# Patient Record
Sex: MALE | Race: WHITE | Employment: FULL TIME | URBAN - METROPOLITAN AREA
[De-identification: names, ages, dates, MRNs, and addresses within clinical notes are randomized per-mention and may not be internally consistent; named-entity substitution may affect disease eponyms.]

---

## 2021-09-19 ENCOUNTER — HOSPITAL ENCOUNTER (EMERGENCY)
Age: 26
Discharge: HOME | End: 2021-09-19
Attending: EMERGENCY MEDICINE
Payer: COMMERCIAL

## 2021-09-19 VITALS
SYSTOLIC BLOOD PRESSURE: 164 MMHG | RESPIRATION RATE: 16 BRPM | WEIGHT: 250 LBS | HEART RATE: 78 BPM | OXYGEN SATURATION: 100 % | BODY MASS INDEX: 30.44 KG/M2 | HEIGHT: 76 IN | DIASTOLIC BLOOD PRESSURE: 88 MMHG | TEMPERATURE: 98.1 F

## 2021-09-19 DIAGNOSIS — S01.01XA LACERATION OF SCALP, INITIAL ENCOUNTER: Primary | ICD-10-CM

## 2021-09-19 DIAGNOSIS — S09.90XA INJURY OF HEAD, INITIAL ENCOUNTER: ICD-10-CM

## 2021-09-19 PROCEDURE — 99283 EMERGENCY DEPT VISIT LOW MDM: CPT

## 2021-09-19 PROCEDURE — 75810000293 HC SIMP/SUPERF WND  RPR

## 2021-09-19 PROCEDURE — 74011250637 HC RX REV CODE- 250/637: Performed by: EMERGENCY MEDICINE

## 2021-09-19 RX ORDER — OXYCODONE AND ACETAMINOPHEN 5; 325 MG/1; MG/1
1 TABLET ORAL
Status: DISCONTINUED | OUTPATIENT
Start: 2021-09-19 | End: 2021-09-19

## 2021-09-19 RX ORDER — OXYCODONE AND ACETAMINOPHEN 5; 325 MG/1; MG/1
1 TABLET ORAL
Status: COMPLETED | OUTPATIENT
Start: 2021-09-19 | End: 2021-09-19

## 2021-09-19 RX ADMIN — OXYCODONE HYDROCHLORIDE AND ACETAMINOPHEN 1 TABLET: 5; 325 TABLET ORAL at 14:13

## 2021-09-19 NOTE — ED TRIAGE NOTES
Patient states a large  hit a piece of wood and struck him in the head. No loc.  Laceration to head noted pressure dressing applied

## 2021-09-19 NOTE — LETTER
NOTIFICATION RETURN TO WORK / SCHOOL    9/19/2021 2:09 PM    Mr. Cornelio Mayo  [facility] [id number]      To Whom It May Concern:    Cornelio Mayo is currently under the care of [facility] EMERGENCY DEPT. He will return to work/school on: 9/22/21. If there are questions or concerns please have the patient contact our office.         Sincerely,      BONITA DesaiC

## 2021-09-19 NOTE — ED PROVIDER NOTES
EMERGENCY DEPARTMENT HISTORY AND PHYSICAL EXAM    Date: 9/19/2021  Patient Name: Shyanne Finch    History of Presenting Illness     Chief Complaint   Patient presents with    Head Injury         History Provided By: Patient      Additional History (Context): Shyanne Finch is a 23 y.o. male with No significant past medical history who presents with scalp laceration to right parietal scalp after injury today. He was standing with his back and side turned when a riding lawnmower drivers machine expelled a large wooden stick striking the back of his head. Patient's tetanus is up-to-date. Denies LOC vomiting. Has focal tenderness at the site of penetration. PCP: No primary care provider on file. Current Facility-Administered Medications   Medication Dose Route Frequency Provider Last Rate Last Admin    oxyCODONE-acetaminophen (PERCOCET) 5-325 mg per tablet 1 Tablet  1 Tablet Oral NOW DARYL Fish           Past History     Past Medical History:  No past medical history on file. Past Surgical History:  No past surgical history on file. Family History:  No family history on file. Social History:  Social History     Tobacco Use    Smoking status: Never Smoker   Substance Use Topics    Alcohol use: Not Currently     Comment: social    Drug use: Never       Allergies:  No Known Allergies      Review of Systems   Review of Systems   Eyes: Negative for visual disturbance. Gastrointestinal: Negative for nausea and vomiting. Skin: Positive for wound. Neurological: Positive for headaches. Negative for dizziness, syncope, speech difficulty, weakness, light-headedness and numbness. Hematological: Does not bruise/bleed easily. All Other Systems Negative  Physical Exam     Vitals:    09/19/21 1342   BP: (!) 164/88   Pulse: 78   Resp: 16   Temp: 98.1 °F (36.7 °C)   SpO2: 100%   Weight: 113.4 kg (250 lb)   Height: 6' 4\" (1.93 m)     Physical Exam  Vitals and nursing note reviewed.    Constitutional: General: He is not in acute distress. Appearance: He is well-developed. He is not ill-appearing, toxic-appearing or diaphoretic. HENT:      Head: Normocephalic. Comments: 4 cm laceration to right parietal scalp. Bleeding is controlled. Is approximate well. Neck:      Thyroid: No thyromegaly. Vascular: No carotid bruit. Trachea: No tracheal deviation. Cardiovascular:      Rate and Rhythm: Normal rate and regular rhythm. Heart sounds: Normal heart sounds. No murmur heard. No friction rub. No gallop. Pulmonary:      Effort: Pulmonary effort is normal. No respiratory distress. Breath sounds: Normal breath sounds. No stridor. No wheezing or rales. Chest:      Chest wall: No tenderness. Abdominal:      General: There is no distension. Palpations: Abdomen is soft. There is no mass. Tenderness: There is no abdominal tenderness. There is no guarding or rebound. Musculoskeletal:         General: Normal range of motion. Cervical back: Normal range of motion and neck supple. Skin:     General: Skin is warm and dry. Coloration: Skin is not pale. Neurological:      Mental Status: He is alert. Psychiatric:         Speech: Speech normal.         Behavior: Behavior normal.         Thought Content: Thought content normal.         Judgment: Judgment normal.          Diagnostic Study Results     Labs -   No results found for this or any previous visit (from the past 12 hour(s)). Radiologic Studies -   No orders to display     CT Results  (Last 48 hours)    None        CXR Results  (Last 48 hours)    None            Medical Decision Making   I am the first provider for this patient. I reviewed the vital signs, available nursing notes, past medical history, past surgical history, family history and social history. Vital Signs-Reviewed the patient's vital signs.     Records Reviewed: Nursing Notes    Procedures:  Wound Repair    Date/Time: 9/19/2021 2:00 PM  Performed by: [id number] provider: Dinora Shepherd  Preparation: skin prepped with Guillermina-Clens  Pre-procedure re-eval: Immediately prior to the procedure, the patient was reevaluated and found suitable for the planned procedure and any planned medications. Time out: Immediately prior to the procedure a time out was called to verify the correct patient, procedure, equipment, staff and marking as appropriate. .  Location details: scalp  Foreign bodies: no foreign bodies  Irrigation solution: saline  Skin closure: staples  Number of sutures: 14  Patient tolerance: patient tolerated the procedure well with no immediate complications  My total time at bedside, performing this procedure was 1-15 minutes. Provider Notes (Medical Decision Making):   Scalp laceration repaired. MED RECONCILIATION:  Current Facility-Administered Medications   Medication Dose Route Frequency    oxyCODONE-acetaminophen (PERCOCET) 5-325 mg per tablet 1 Tablet  1 Tablet Oral NOW     No current outpatient medications on file. Disposition:  home    DISCHARGE NOTE:   2:14 PM    Pt has been reexamined. Patient has no new complaints, changes, or physical findings. Care plan outlined and precautions discussed. Results of exam were reviewed with the patient. All medications were reviewed with the patient. All of pt's questions and concerns were addressed. Patient was instructed and agrees to follow up with PCP, as well as to return to the ED upon further deterioration. Patient is ready to go home. Follow-up Information     Follow up With Specialties Details Why [address]    [facility] EMERGENCY DEPT Emergency Medicine In 1 week For suture removal, For wound re-check [address] [place] [id number]  [phone]    [facility] EMERGENCY DEPT Emergency Medicine  If symptoms worsen return immediately [address]  [phone]          There are no discharge medications for this patient.     Diagnosis Clinical Impression:   1. Laceration of scalp, initial encounter    2.  Injury of head, initial encounter